# Patient Record
Sex: FEMALE | Race: WHITE | NOT HISPANIC OR LATINO | Employment: OTHER | URBAN - METROPOLITAN AREA
[De-identification: names, ages, dates, MRNs, and addresses within clinical notes are randomized per-mention and may not be internally consistent; named-entity substitution may affect disease eponyms.]

---

## 2021-09-08 ENCOUNTER — TELEPHONE ENCOUNTER (OUTPATIENT)
Dept: URBAN - METROPOLITAN AREA CLINIC 35 | Facility: CLINIC | Age: 86
End: 2021-09-08

## 2021-09-14 ENCOUNTER — OFFICE VISIT (OUTPATIENT)
Dept: URBAN - METROPOLITAN AREA CLINIC 35 | Facility: CLINIC | Age: 86
End: 2021-09-14

## 2021-09-14 VITALS
BODY MASS INDEX: 27.8 KG/M2 | DIASTOLIC BLOOD PRESSURE: 78 MMHG | HEIGHT: 66 IN | WEIGHT: 173 LBS | OXYGEN SATURATION: 97 % | SYSTOLIC BLOOD PRESSURE: 128 MMHG | TEMPERATURE: 97.2 F | HEART RATE: 88 BPM

## 2021-09-14 RX ORDER — GLIMEPIRIDE 1 MG/1
1 TABLET WITH BREAKFAST OR THE FIRST MAIN MEAL OF THE DAY TABLET ORAL ONCE A DAY
Status: DISCONTINUED | COMMUNITY

## 2021-09-14 RX ORDER — ATORVASTATIN CALCIUM 40 MG/1
1 TABLET TABLET, FILM COATED ORAL ONCE A DAY
Status: ACTIVE | COMMUNITY

## 2021-09-14 RX ORDER — LEVOTHYROXINE SODIUM 0.05 MG/1
1 TABLET ON AN EMPTY STOMACH IN THE MORNING TABLET ORAL ONCE A DAY
Status: ACTIVE | COMMUNITY

## 2021-09-14 RX ORDER — BUPROPION HYDROCHLORIDE 300 MG/1
1 TABLET IN THE MORNING TABLET, EXTENDED RELEASE ORAL ONCE A DAY
Status: ACTIVE | COMMUNITY

## 2021-09-14 RX ORDER — CLINDAMYCIN HYDROCHLORIDE 150 MG/1
4 CAPSULES CAPSULE ORAL
Status: ACTIVE | COMMUNITY

## 2021-09-14 RX ORDER — DOXEPIN HYDROCHLORIDE 10 MG/1
1 CAPSULE AT BEDTIME CAPSULE ORAL ONCE A DAY
Status: DISCONTINUED | COMMUNITY

## 2021-09-14 RX ORDER — CARVEDILOL 3.12 MG/1
1 TABLET WITH FOOD TABLET, FILM COATED ORAL TWICE A DAY
Qty: 60 | Status: ACTIVE | COMMUNITY

## 2021-09-14 RX ORDER — ALLOPURINOL 100 MG/1
1 TABLET TABLET ORAL ONCE A DAY
Status: DISCONTINUED | COMMUNITY

## 2021-09-14 RX ORDER — OMEPRAZOLE MAGNESIUM 20 MG/1
1 CAPSULE CAPSULE, DELAYED RELEASE ORAL ONCE A DAY
Status: DISCONTINUED | COMMUNITY

## 2021-09-14 RX ORDER — FUROSEMIDE 20 MG/1
1 TABLET TABLET ORAL ONCE A DAY
Status: ACTIVE | COMMUNITY

## 2021-09-14 RX ORDER — TEMAZEPAM 15 MG/1
1 CAPSULE AT BEDTIME AS NEEDED CAPSULE ORAL ONCE A DAY
Status: ACTIVE | COMMUNITY

## 2021-09-14 RX ORDER — BUPROPION HYDROCHLORIDE 150 MG/1
1 TABLET TABLET, EXTENDED RELEASE ORAL TWICE A DAY
Status: DISCONTINUED | COMMUNITY

## 2021-09-14 NOTE — HPI-MIGRATED HPI
;   ;     Heartburn : Patient denies any heartburn at this time . She admits having Nissen Fundoplication completed in late 2017 by Dr King Wilkes .    Of last visit (06/2017) Patient presents today for a consultation of heartburn.  Onset years ago and has been more frequent over the past 2 years.  Symptoms consist of oral regurgitation, sour eructations, retrosternal burning and epigastric pain. Admits associated early satiety, indigestion and S.O.B. Symptoms are provoked with over eating.  She has been taking Omeprazole 20 mg for years with some relief and has been taking 5-6 Tums daily since May 30, 2017 with relief.     Denies dysphagia, odynophagia, nausea or vomiting.  Was treated at [facility] ED May 30, 2017 with S.O.B with Carafate tablets, but discontinued after 8 doses due to abdominal fullness sensation that cause epigastric pain.  CTA Chest with contrast revealed a large hiatal hernia.   Admits previous ER visit in [place], [place] for same symptoms 2 years ago.    Admits having an EGD 10 years ago, but can not recall the findings.  Admits a family history of hiatal hernia with her mother and grandson. She admits having a Colonoscopy 10 years ago with one benign polyp.;   Constipation :                             87 year old female patient presents for constipation consult. Patient was last seen in our office 06/2017 for heartburn.  Patient states symptoms began approximately 2 weeks . Patient admits taking OTC  Herb lax , Miralax for relief. Patient admits having maybe 1 bowel movement a day.  Patient admits stools are now runny_. Patient denies rectal bleeding /melena. Patient admits  strenuous bowel movements before laxative .    Patient denies having recent abdominal Xray. Patient admits  having most recent  labs 02/2017.  ;

## 2021-10-14 ENCOUNTER — OFFICE VISIT (OUTPATIENT)
Dept: URBAN - METROPOLITAN AREA CLINIC 35 | Facility: CLINIC | Age: 86
End: 2021-10-14

## 2021-10-14 ENCOUNTER — DASHBOARD ENCOUNTERS (OUTPATIENT)
Age: 86
End: 2021-10-14

## 2021-10-14 VITALS
SYSTOLIC BLOOD PRESSURE: 121 MMHG | OXYGEN SATURATION: 97 % | WEIGHT: 179 LBS | DIASTOLIC BLOOD PRESSURE: 79 MMHG | HEART RATE: 77 BPM | HEIGHT: 66 IN | BODY MASS INDEX: 28.77 KG/M2

## 2021-10-14 RX ORDER — BUPROPION HYDROCHLORIDE 300 MG/1
1 TABLET IN THE MORNING TABLET, EXTENDED RELEASE ORAL ONCE A DAY
Status: ACTIVE | COMMUNITY

## 2021-10-14 RX ORDER — CLINDAMYCIN HYDROCHLORIDE 150 MG/1
4 CAPSULES CAPSULE ORAL
Status: ACTIVE | COMMUNITY

## 2021-10-14 RX ORDER — ATORVASTATIN CALCIUM 40 MG/1
1 TABLET TABLET, FILM COATED ORAL ONCE A DAY
Status: ACTIVE | COMMUNITY

## 2021-10-14 RX ORDER — TEMAZEPAM 15 MG/1
1 CAPSULE AT BEDTIME AS NEEDED CAPSULE ORAL ONCE A DAY
Status: ACTIVE | COMMUNITY

## 2021-10-14 RX ORDER — LEVOTHYROXINE SODIUM 0.05 MG/1
1 TABLET ON AN EMPTY STOMACH IN THE MORNING TABLET ORAL ONCE A DAY
Status: ACTIVE | COMMUNITY

## 2021-10-14 RX ORDER — CARVEDILOL 3.12 MG/1
1 TABLET WITH FOOD TABLET, FILM COATED ORAL TWICE A DAY
Qty: 60 | Status: ACTIVE | COMMUNITY

## 2021-10-14 RX ORDER — FUROSEMIDE 20 MG/1
1 TABLET TABLET ORAL ONCE A DAY
Status: ACTIVE | COMMUNITY

## 2021-10-14 NOTE — EXAM-MIGRATED EXAMINATIONS
GENERAL APPEARANCE: - alert, in no acute distress, well developed, well nourished;   ORAL CAVITY: - mucosa moist;

## 2021-10-14 NOTE — HPI-MIGRATED HPI
;   ;     Heartburn : Patient denies any heartburn at this time . She admits having Nissen Fundoplication completed in late 2017 by Dr King Wilkes .    Of last visit (06/2017) Patient presents today for a consultation of heartburn.  Onset years ago and has been more frequent over the past 2 years.  Symptoms consist of oral regurgitation, sour eructations, retrosternal burning and epigastric pain. Admits associated early satiety, indigestion and S.O.B. Symptoms are provoked with over eating.  She has been taking Omeprazole 20 mg for years with some relief and has been taking 5-6 Tums daily since May 30, 2017 with relief.     Denies dysphagia, odynophagia, nausea or vomiting.  Was treated at [facility] ED May 30, 2017 with S.O.B with Carafate tablets, but discontinued after 8 doses due to abdominal fullness sensation that cause epigastric pain.  CTA Chest with contrast revealed a large hiatal hernia.   Admits previous ER visit in [place], [place] for same symptoms 2 years ago.    Admits having an EGD 10 years ago, but can not recall the findings.  Admits a family history of hiatal hernia with her mother and grandson. She admits having a Colonoscopy 10 years ago with one benign polyp.;   Constipation : Patient is present today for her 3 week follow-up for constipstion. Patient sdmits to taking otc miralax and a herbal laxative. Patient states she has some relief and is having one bowel movements a day. Patient admits to having 1-2 bowel movements while taking otc medication.  Last Visit (09/14/2021)87 year old female patient presents for constipation consult. Patient was last seen in our office 06/2017 for heartburn.  Patient states symptoms began approximately 2 weeks . Patient admits taking OTC  Herb lax , Miralax for relief. Patient admits having maybe 1 bowel movement a day.  Patient admits stools are now runny_. Patient denies rectal bleeding /melena. Patient admits  strenuous bowel movements before laxative .    Patient denies having recent abdominal Xray. Patient admits  having most recent  labs 02/2017.;

## 2021-10-15 PROBLEM — 14760008 CONSTIPATION: Status: ACTIVE | Noted: 2021-09-14

## 2021-10-15 PROBLEM — 79922009 EPIGASTRIC PAIN: Status: ACTIVE | Noted: 2021-10-15

## 2021-10-15 PROBLEM — 39839004 DIAPHRAGMATIC HERNIA: Status: ACTIVE | Noted: 2021-10-15
